# Patient Record
Sex: FEMALE | Race: WHITE
[De-identification: names, ages, dates, MRNs, and addresses within clinical notes are randomized per-mention and may not be internally consistent; named-entity substitution may affect disease eponyms.]

---

## 2018-10-16 ENCOUNTER — HOSPITAL ENCOUNTER (OUTPATIENT)
Dept: HOSPITAL 88 - MAMMO | Age: 43
End: 2018-10-16
Attending: FAMILY MEDICINE
Payer: COMMERCIAL

## 2018-10-16 DIAGNOSIS — Z12.31: Primary | ICD-10-CM

## 2018-10-16 PROCEDURE — 77067 SCR MAMMO BI INCL CAD: CPT

## 2018-10-31 NOTE — DIAGNOSTIC IMAGING REPORT
#[id number] - [id number]

#BILATERAL DIGITAL SCREENING MAMMOGRAM WITH CAD: 10/16/2018

CLINICAL: Routine screening.  



Comparison is made to exam dated:  12/14/2010 mammogram - [facility].  

Current study contains 4 films.  

The tissue of both breasts is extremely dense, which lowers the sensitivity of mammography.  

Current study was also evaluated with a Computer Aided Detection (CAD) system.  

There is a biopsy clip in the right breast.  The implants have been removed.  

No significant masses, calcifications, or other findings are seen in either breast.  

There has been no significant interval change.



IMPRESSION: BENIGN

There is no mammographic evidence of malignancy.  A 1 year screening mammogram is recommended. 

The patient will be notified by letter of the results.  





Derek Schulz Jr., D.O.          

cw/:10/30/2018 14:33:58  



Imaging Technologist: Hoda CALDWELL(R)(M), [facility]

letter sent: Compared to Prior B9  

Mammogram BI-RADS: 2 Benign